# Patient Record
Sex: MALE | Race: WHITE | NOT HISPANIC OR LATINO | Employment: OTHER | URBAN - METROPOLITAN AREA
[De-identification: names, ages, dates, MRNs, and addresses within clinical notes are randomized per-mention and may not be internally consistent; named-entity substitution may affect disease eponyms.]

---

## 2020-03-10 ENCOUNTER — EVALUATION (OUTPATIENT)
Dept: PHYSICAL THERAPY | Facility: CLINIC | Age: 75
End: 2020-03-10
Payer: COMMERCIAL

## 2020-03-10 ENCOUNTER — TRANSCRIBE ORDERS (OUTPATIENT)
Dept: PHYSICAL THERAPY | Facility: CLINIC | Age: 75
End: 2020-03-10

## 2020-03-10 DIAGNOSIS — M25.561 CHRONIC PAIN OF RIGHT KNEE: Primary | ICD-10-CM

## 2020-03-10 DIAGNOSIS — G89.29 CHRONIC PAIN OF RIGHT KNEE: Primary | ICD-10-CM

## 2020-03-10 DIAGNOSIS — M25.561 RECURRENT PAIN OF RIGHT KNEE: Primary | ICD-10-CM

## 2020-03-10 PROCEDURE — 97110 THERAPEUTIC EXERCISES: CPT | Performed by: PHYSICAL THERAPIST

## 2020-03-10 PROCEDURE — 97162 PT EVAL MOD COMPLEX 30 MIN: CPT | Performed by: PHYSICAL THERAPIST

## 2020-03-10 PROCEDURE — 97530 THERAPEUTIC ACTIVITIES: CPT | Performed by: PHYSICAL THERAPIST

## 2020-03-10 NOTE — PROGRESS NOTES
PT Evaluation     Today's date: 3/10/2020  Patient name: Olena Chandler  :   MRN: [id number]  Referring provider: Eder St MD  Dx:   Encounter Diagnosis     ICD-10-CM    1  Recurrent pain of right knee M25 561                   Assessment  Assessment details: Olena Chandler is a 75 y o  male presenting to outpatient physical therapy at [place] [address] with complaints of R knee pain and presents for pre-op OP PT  He presents with decreased range of motion, decreased strength, limited flexibility, poor postural awareness, poor body mechanics, poor balance, decreased tolerance to activity and decreased functional mobility due to Recurrent pain of right knee  (primary encounter diagnosis)   Patient was educated thoroughly on expectations after surgery with pain and possible swelling, edema management, updated and reviewed HEP, stair mobility, and using AD  Reviewed information with wife  He would benefit from skilled PT services in order to address these deficits and reach maximum level of function   Thank you for the referral!  Impairments: abnormal gait, abnormal muscle firing, abnormal muscle tone, abnormal or restricted ROM, abnormal movement, activity intolerance, impaired balance, impaired physical strength, lacks appropriate home exercise program, pain with function, weight-bearing intolerance and poor body mechanics    Symptom irritability: moderateUnderstanding of Dx/Px/POC: good   Prognosis: good    Goals  STG/LTGs (in 4 weeks):  1  Pt will be independent with HEP  2  Pt will demonstrate independence with using AD  3  Pt will demonstrate safety with stair mobility and independence       Plan  Patient would benefit from: PT eval  Planned modality interventions: unattended electrical stimulation and TENS  Planned therapy interventions: strengthening, stretching, therapeutic activities, therapeutic exercise, home exercise program, patient education, joint mobilization, balance and manual therapy  Frequency: 2x week  Plan of Care beginning date: 3/10/2020  Plan of Care expiration date: 2020  Treatment plan discussed with: patient and family        Subjective Evaluation    History of Present Illness  Mechanism of injury: Pt is a 75year old male who presents with long history of right knee pain and is pre-op R TKA scheduled on 3/30/2020  He reports having an Xray that revealed R OA  Now referred to skilled OP PT     Quality of life: good    Pain  Current pain rating: 3  At best pain ratin  At worst pain ratin  Quality: discomfort and dull ache  Relieving factors: relaxation and rest  Aggravating factors: standing, walking and stair climbing  Progression: worsening    Social Support  Steps to enter house: yes (4 steps to enter without railing)  Stairs in house: yes (14 stairs with R sided rail)       Diagnostic Tests  X-ray: abnormal  Patient Goals  Patient goals for therapy: decreased pain, improved balance, increased motion, increased strength, independence with ADLs/IADLs, return to sport/leisure activities and return to work          Objective  Palpation: TTPR medial and lateral joint line, anterior tibialis    AROM: (measured in degrees); (*=pain)     (R)   (L)  Knee Flexion  110*   128  Knee Extension -10 (hypo)  0    MMT:     (R)   (L)  Static K' Ext  4-/5   4+/5  Static K' Flex  4-/5   4+/5  Dynamic K' Ext 3-/5   4+/5  Dynamic K' Flex 3-/5   4+/5    Balance: SLS: (R) unable (L) unable     Special tests: (-) Thessley's test, (+) Irma's test, (+) Valgus test      Precautions: standard    HEP: quad sets, heel slides    Specialty Daily Treatment Diary       Manual 3/10                                               Exercise Diary         Quad sets 5 sec x 10       Heel slides  5 sec x 10       Seated Hamstring stretch                Gait Training with AD        Stair mobility        Transfer training EDU/HEP 25 mins        Modalities

## 2020-03-10 NOTE — LETTER
March 10, 2020    Lorene Shah MD  [address] [place] [address],[place] [address]  [facility]    Patient: Mike Delgado   YOB: 1945   Date of Visit: 3/10/2020     Encounter Diagnosis     ICD-10-CM    1  Recurrent pain of right knee M25 561        Dear Dr Kain Jones: Thank you for your recent referral of Mike Delgado  Please review the attached evaluation summary from Daniel's recent visit  Please verify that you agree with the plan of care by signing the attached order  If you have any questions or concerns, please do not hesitate to call  I sincerely appreciate the opportunity to share in the care of one of your patients and hope to have another opportunity to work with you in the near future  Sincerely,    Connor Prabhakar, PT      Referring Provider:      I certify that I have read the below Plan of Care and certify the need for these services furnished under this plan of treatment while under my care  Lorene Shah MD  [address] [place] [address],[address]: [phone]          PT Evaluation     Today's date: 3/10/2020  Patient name: Mike Delgado  :   MRN: [id number]  Referring provider: Mason Westfall MD  Dx:   Encounter Diagnosis     ICD-10-CM    1  Recurrent pain of right knee M25 561                   Assessment  Assessment details: Mike Delgado is a 75 y o  male presenting to outpatient physical therapy at [place] [address] with complaints of R knee pain and presents for pre-op OP PT   He presents with decreased range of motion, decreased strength, limited flexibility, poor postural awareness, poor body mechanics, poor balance, decreased tolerance to activity and decreased functional mobility due to Recurrent pain of right knee  (primary encounter diagnosis)   Patient was educated thoroughly on expectations after surgery with pain and possible swelling, edema management, updated and reviewed HEP, stair mobility, and using AD  Reviewed information with wife  He would benefit from skilled PT services in order to address these deficits and reach maximum level of function   Thank you for the referral!  Impairments: abnormal gait, abnormal muscle firing, abnormal muscle tone, abnormal or restricted ROM, abnormal movement, activity intolerance, impaired balance, impaired physical strength, lacks appropriate home exercise program, pain with function, weight-bearing intolerance and poor body mechanics    Symptom irritability: moderateUnderstanding of Dx/Px/POC: good   Prognosis: good    Goals  STG/LTGs (in 4 weeks):  1  Pt will be independent with HEP  2  Pt will demonstrate independence with using AD  3  Pt will demonstrate safety with stair mobility and independence  Plan  Patient would benefit from: PT eval  Planned modality interventions: unattended electrical stimulation and TENS  Planned therapy interventions: strengthening, stretching, therapeutic activities, therapeutic exercise, home exercise program, patient education, joint mobilization, balance and manual therapy  Frequency: 2x week  Plan of Care beginning date: 3/10/2020  Plan of Care expiration date: 2020  Treatment plan discussed with: patient and family        Subjective Evaluation    History of Present Illness  Mechanism of injury: Pt is a 75year old male who presents with long history of right knee pain and is pre-op R TKA scheduled on 3/30/2020  He reports having an Xray that revealed R OA  Now referred to skilled OP PT     Quality of life: good    Pain  Current pain rating: 3  At best pain ratin  At worst pain ratin  Quality: discomfort and dull ache  Relieving factors: relaxation and rest  Aggravating factors: standing, walking and stair climbing  Progression: worsening    Social Support  Steps to enter house: yes (4 steps to enter without railing)  Stairs in house: yes (14 stairs with R sided rail)       Diagnostic Tests  X-ray: abnormal  Patient Goals  Patient goals for therapy: decreased pain, improved balance, increased motion, increased strength, independence with ADLs/IADLs, return to sport/leisure activities and return to work          Objective  Palpation: TTPR medial and lateral joint line, anterior tibialis    AROM: (measured in degrees); (*=pain)     (R)   (L)  Knee Flexion  110*   128  Knee Extension -10 (hypo)  0    MMT:     (R)   (L)  Static K' Ext  4-/5   4+/5  Static K' Flex  4-/5   4+/5  Dynamic K' Ext 3-/5   4+/5  Dynamic K' Flex 3-/5   4+/5    Balance: SLS: (R) unable (L) unable     Special tests: (-) Thessley's test, (+) Irma's test, (+) Valgus test      Precautions: standard    HEP: quad sets, heel slides    Specialty Daily Treatment Diary       Manual 3/10                                               Exercise Diary         Quad sets 5 sec x 10       Heel slides  5 sec x 10                                                                                                                                               EDU/HEP 25 mins        Modalities

## 2020-03-17 ENCOUNTER — OFFICE VISIT (OUTPATIENT)
Dept: PHYSICAL THERAPY | Facility: CLINIC | Age: 75
End: 2020-03-17
Payer: COMMERCIAL

## 2020-03-17 DIAGNOSIS — M25.561 RECURRENT PAIN OF RIGHT KNEE: Primary | ICD-10-CM

## 2020-03-17 PROCEDURE — 97530 THERAPEUTIC ACTIVITIES: CPT | Performed by: PHYSICAL THERAPIST

## 2020-03-17 PROCEDURE — 97110 THERAPEUTIC EXERCISES: CPT

## 2020-03-17 NOTE — PROGRESS NOTES
Daily Note     Today's date: 3/17/2020  Patient name: Stanton Ballard  :   MRN: [id number]  Referring provider: Iesha Bradford MD  Dx:   Encounter Diagnosis     ICD-10-CM    1  Recurrent pain of right knee M25 561                   Subjective: Patient states he is doing well today  Objective: See treatment diary below      Assessment: Tolerated treatment well  Initiated POC with gait training, transfer training and stair mobility instructed by Luz Maria Curry DPT  Introduced TE to  [facility] this visit, required occasional VCs for correct form and technique  Provided patient with HEP  Patient would benefit from continued PT      Plan: Continue per plan of care          HEP: quad sets, heel slides    Specialty Daily Treatment Diary       Manual 3/10 3/17                                              Exercise Diary         Quad sets 5 sec x 10       Heel slides  5 sec x 10       Seated Hamstring stretch  20 sec x 3              Gait Training with AD  SF      Stair mobility  SF      Transfer training   SF      Gastroc stretch  20"x3      3 way H'   10x ea direction      SLR (flex, abd)  10x ea direction      SAQ/LAQ  10x ea                                                                      EDU/HEP 25 mins        Modalities

## 2020-06-01 ENCOUNTER — APPOINTMENT (OUTPATIENT)
Dept: PHYSICAL THERAPY | Facility: CLINIC | Age: 75
End: 2020-06-01
Payer: COMMERCIAL

## 2020-06-03 ENCOUNTER — APPOINTMENT (OUTPATIENT)
Dept: PHYSICAL THERAPY | Facility: CLINIC | Age: 75
End: 2020-06-03
Payer: COMMERCIAL

## 2020-06-03 ENCOUNTER — EVALUATION (OUTPATIENT)
Dept: PHYSICAL THERAPY | Facility: CLINIC | Age: 75
End: 2020-06-03
Payer: COMMERCIAL

## 2020-06-03 DIAGNOSIS — R26.2 DIFFICULTY WALKING: ICD-10-CM

## 2020-06-03 DIAGNOSIS — M25.561 RECURRENT PAIN OF RIGHT KNEE: Primary | ICD-10-CM

## 2020-06-03 DIAGNOSIS — Z96.651 STATUS POST TOTAL RIGHT KNEE REPLACEMENT: ICD-10-CM

## 2020-06-03 DIAGNOSIS — M62.81 GENERALIZED MUSCLE WEAKNESS: ICD-10-CM

## 2020-06-03 PROCEDURE — 97530 THERAPEUTIC ACTIVITIES: CPT | Performed by: PHYSICAL THERAPIST

## 2020-06-03 PROCEDURE — 97164 PT RE-EVAL EST PLAN CARE: CPT | Performed by: PHYSICAL THERAPIST

## 2020-06-03 PROCEDURE — 97110 THERAPEUTIC EXERCISES: CPT | Performed by: PHYSICAL THERAPIST

## 2020-06-04 ENCOUNTER — TRANSCRIBE ORDERS (OUTPATIENT)
Dept: PHYSICAL THERAPY | Facility: CLINIC | Age: 75
End: 2020-06-04

## 2020-06-04 DIAGNOSIS — M25.561 RECURRENT PAIN OF RIGHT KNEE: Primary | ICD-10-CM

## 2020-06-05 ENCOUNTER — APPOINTMENT (OUTPATIENT)
Dept: PHYSICAL THERAPY | Facility: CLINIC | Age: 75
End: 2020-06-05
Payer: COMMERCIAL

## 2020-06-05 ENCOUNTER — OFFICE VISIT (OUTPATIENT)
Dept: PHYSICAL THERAPY | Facility: CLINIC | Age: 75
End: 2020-06-05
Payer: COMMERCIAL

## 2020-06-05 DIAGNOSIS — Z96.651 STATUS POST TOTAL RIGHT KNEE REPLACEMENT: ICD-10-CM

## 2020-06-05 DIAGNOSIS — M25.561 RECURRENT PAIN OF RIGHT KNEE: Primary | ICD-10-CM

## 2020-06-05 DIAGNOSIS — R26.2 DIFFICULTY WALKING: ICD-10-CM

## 2020-06-05 DIAGNOSIS — M62.81 GENERALIZED MUSCLE WEAKNESS: ICD-10-CM

## 2020-06-05 PROCEDURE — 97110 THERAPEUTIC EXERCISES: CPT | Performed by: PHYSICAL THERAPIST

## 2020-06-05 PROCEDURE — 97140 MANUAL THERAPY 1/> REGIONS: CPT | Performed by: PHYSICAL THERAPIST

## 2020-06-08 ENCOUNTER — OFFICE VISIT (OUTPATIENT)
Dept: PHYSICAL THERAPY | Facility: CLINIC | Age: 75
End: 2020-06-08
Payer: COMMERCIAL

## 2020-06-08 ENCOUNTER — APPOINTMENT (OUTPATIENT)
Dept: PHYSICAL THERAPY | Facility: CLINIC | Age: 75
End: 2020-06-08
Payer: COMMERCIAL

## 2020-06-08 DIAGNOSIS — M25.561 RECURRENT PAIN OF RIGHT KNEE: Primary | ICD-10-CM

## 2020-06-08 DIAGNOSIS — M62.81 GENERALIZED MUSCLE WEAKNESS: ICD-10-CM

## 2020-06-08 DIAGNOSIS — R26.2 DIFFICULTY WALKING: ICD-10-CM

## 2020-06-08 DIAGNOSIS — Z96.651 STATUS POST TOTAL RIGHT KNEE REPLACEMENT: ICD-10-CM

## 2020-06-08 PROCEDURE — 97112 NEUROMUSCULAR REEDUCATION: CPT | Performed by: PHYSICAL THERAPIST

## 2020-06-08 PROCEDURE — 97110 THERAPEUTIC EXERCISES: CPT | Performed by: PHYSICAL THERAPIST

## 2020-06-08 PROCEDURE — 97140 MANUAL THERAPY 1/> REGIONS: CPT | Performed by: PHYSICAL THERAPIST

## 2020-06-09 ENCOUNTER — APPOINTMENT (OUTPATIENT)
Dept: PHYSICAL THERAPY | Facility: CLINIC | Age: 75
End: 2020-06-09
Payer: COMMERCIAL

## 2020-06-10 ENCOUNTER — APPOINTMENT (OUTPATIENT)
Dept: PHYSICAL THERAPY | Facility: CLINIC | Age: 75
End: 2020-06-10
Payer: COMMERCIAL

## 2020-06-11 ENCOUNTER — APPOINTMENT (OUTPATIENT)
Dept: PHYSICAL THERAPY | Facility: CLINIC | Age: 75
End: 2020-06-11
Payer: COMMERCIAL

## 2020-06-12 ENCOUNTER — APPOINTMENT (OUTPATIENT)
Dept: PHYSICAL THERAPY | Facility: CLINIC | Age: 75
End: 2020-06-12
Payer: COMMERCIAL

## 2020-06-15 ENCOUNTER — OFFICE VISIT (OUTPATIENT)
Dept: PHYSICAL THERAPY | Facility: CLINIC | Age: 75
End: 2020-06-15
Payer: COMMERCIAL

## 2020-06-15 DIAGNOSIS — R26.2 DIFFICULTY WALKING: ICD-10-CM

## 2020-06-15 DIAGNOSIS — Z96.651 STATUS POST TOTAL RIGHT KNEE REPLACEMENT: ICD-10-CM

## 2020-06-15 DIAGNOSIS — M25.561 RECURRENT PAIN OF RIGHT KNEE: Primary | ICD-10-CM

## 2020-06-15 DIAGNOSIS — M62.81 GENERALIZED MUSCLE WEAKNESS: ICD-10-CM

## 2020-06-15 PROCEDURE — 97112 NEUROMUSCULAR REEDUCATION: CPT

## 2020-06-15 PROCEDURE — 97110 THERAPEUTIC EXERCISES: CPT

## 2020-06-15 PROCEDURE — 97140 MANUAL THERAPY 1/> REGIONS: CPT

## 2020-06-17 ENCOUNTER — OFFICE VISIT (OUTPATIENT)
Dept: PHYSICAL THERAPY | Facility: CLINIC | Age: 75
End: 2020-06-17
Payer: COMMERCIAL

## 2020-06-17 DIAGNOSIS — M25.561 RECURRENT PAIN OF RIGHT KNEE: Primary | ICD-10-CM

## 2020-06-17 DIAGNOSIS — Z96.651 STATUS POST TOTAL RIGHT KNEE REPLACEMENT: ICD-10-CM

## 2020-06-17 DIAGNOSIS — M62.81 GENERALIZED MUSCLE WEAKNESS: ICD-10-CM

## 2020-06-17 DIAGNOSIS — R26.2 DIFFICULTY WALKING: ICD-10-CM

## 2020-06-17 PROCEDURE — 97530 THERAPEUTIC ACTIVITIES: CPT | Performed by: PHYSICAL THERAPIST

## 2020-06-17 PROCEDURE — 97110 THERAPEUTIC EXERCISES: CPT | Performed by: PHYSICAL THERAPIST

## 2020-06-17 PROCEDURE — 97140 MANUAL THERAPY 1/> REGIONS: CPT | Performed by: PHYSICAL THERAPIST

## 2020-06-17 PROCEDURE — 97112 NEUROMUSCULAR REEDUCATION: CPT | Performed by: PHYSICAL THERAPIST

## 2020-06-19 ENCOUNTER — OFFICE VISIT (OUTPATIENT)
Dept: PHYSICAL THERAPY | Facility: CLINIC | Age: 75
End: 2020-06-19
Payer: COMMERCIAL

## 2020-06-19 DIAGNOSIS — M62.81 GENERALIZED MUSCLE WEAKNESS: ICD-10-CM

## 2020-06-19 DIAGNOSIS — M25.561 RECURRENT PAIN OF RIGHT KNEE: Primary | ICD-10-CM

## 2020-06-19 DIAGNOSIS — Z96.651 STATUS POST TOTAL RIGHT KNEE REPLACEMENT: ICD-10-CM

## 2020-06-19 DIAGNOSIS — R26.2 DIFFICULTY WALKING: ICD-10-CM

## 2020-06-19 PROCEDURE — 97110 THERAPEUTIC EXERCISES: CPT

## 2020-06-19 PROCEDURE — 97140 MANUAL THERAPY 1/> REGIONS: CPT

## 2020-06-19 PROCEDURE — 97112 NEUROMUSCULAR REEDUCATION: CPT

## 2020-06-22 ENCOUNTER — OFFICE VISIT (OUTPATIENT)
Dept: PHYSICAL THERAPY | Facility: CLINIC | Age: 75
End: 2020-06-22
Payer: COMMERCIAL

## 2020-06-22 DIAGNOSIS — M25.561 RECURRENT PAIN OF RIGHT KNEE: Primary | ICD-10-CM

## 2020-06-22 DIAGNOSIS — Z96.651 STATUS POST TOTAL RIGHT KNEE REPLACEMENT: ICD-10-CM

## 2020-06-22 DIAGNOSIS — R26.2 DIFFICULTY WALKING: ICD-10-CM

## 2020-06-22 DIAGNOSIS — M62.81 GENERALIZED MUSCLE WEAKNESS: ICD-10-CM

## 2020-06-22 PROCEDURE — 97110 THERAPEUTIC EXERCISES: CPT

## 2020-06-22 PROCEDURE — 97112 NEUROMUSCULAR REEDUCATION: CPT

## 2020-06-22 PROCEDURE — 97140 MANUAL THERAPY 1/> REGIONS: CPT

## 2020-06-24 ENCOUNTER — OFFICE VISIT (OUTPATIENT)
Dept: PHYSICAL THERAPY | Facility: CLINIC | Age: 75
End: 2020-06-24
Payer: COMMERCIAL

## 2020-06-24 DIAGNOSIS — Z96.651 STATUS POST TOTAL RIGHT KNEE REPLACEMENT: ICD-10-CM

## 2020-06-24 DIAGNOSIS — M25.561 RECURRENT PAIN OF RIGHT KNEE: Primary | ICD-10-CM

## 2020-06-24 DIAGNOSIS — R26.2 DIFFICULTY WALKING: ICD-10-CM

## 2020-06-24 DIAGNOSIS — M62.81 GENERALIZED MUSCLE WEAKNESS: ICD-10-CM

## 2020-06-24 PROCEDURE — 97110 THERAPEUTIC EXERCISES: CPT

## 2020-06-24 PROCEDURE — 97112 NEUROMUSCULAR REEDUCATION: CPT

## 2020-06-24 PROCEDURE — 97140 MANUAL THERAPY 1/> REGIONS: CPT

## 2020-06-26 ENCOUNTER — OFFICE VISIT (OUTPATIENT)
Dept: PHYSICAL THERAPY | Facility: CLINIC | Age: 75
End: 2020-06-26
Payer: COMMERCIAL

## 2020-06-26 DIAGNOSIS — M62.81 GENERALIZED MUSCLE WEAKNESS: ICD-10-CM

## 2020-06-26 DIAGNOSIS — M25.561 RECURRENT PAIN OF RIGHT KNEE: Primary | ICD-10-CM

## 2020-06-26 DIAGNOSIS — R26.2 DIFFICULTY WALKING: ICD-10-CM

## 2020-06-26 DIAGNOSIS — Z96.651 STATUS POST TOTAL RIGHT KNEE REPLACEMENT: ICD-10-CM

## 2020-06-26 PROCEDURE — 97112 NEUROMUSCULAR REEDUCATION: CPT

## 2020-06-26 PROCEDURE — 97110 THERAPEUTIC EXERCISES: CPT

## 2020-06-26 PROCEDURE — 97140 MANUAL THERAPY 1/> REGIONS: CPT

## 2020-06-29 ENCOUNTER — OFFICE VISIT (OUTPATIENT)
Dept: PHYSICAL THERAPY | Facility: CLINIC | Age: 75
End: 2020-06-29
Payer: COMMERCIAL

## 2020-06-29 DIAGNOSIS — M25.561 RECURRENT PAIN OF RIGHT KNEE: Primary | ICD-10-CM

## 2020-06-29 DIAGNOSIS — Z96.651 STATUS POST TOTAL RIGHT KNEE REPLACEMENT: ICD-10-CM

## 2020-06-29 DIAGNOSIS — R26.2 DIFFICULTY WALKING: ICD-10-CM

## 2020-06-29 DIAGNOSIS — M62.81 GENERALIZED MUSCLE WEAKNESS: ICD-10-CM

## 2020-06-29 PROCEDURE — 97140 MANUAL THERAPY 1/> REGIONS: CPT

## 2020-06-29 PROCEDURE — 97110 THERAPEUTIC EXERCISES: CPT

## 2020-06-29 PROCEDURE — 97112 NEUROMUSCULAR REEDUCATION: CPT

## 2020-07-01 ENCOUNTER — OFFICE VISIT (OUTPATIENT)
Dept: PHYSICAL THERAPY | Facility: CLINIC | Age: 75
End: 2020-07-01
Payer: COMMERCIAL

## 2020-07-01 DIAGNOSIS — M62.81 GENERALIZED MUSCLE WEAKNESS: ICD-10-CM

## 2020-07-01 DIAGNOSIS — R26.2 DIFFICULTY WALKING: ICD-10-CM

## 2020-07-01 DIAGNOSIS — Z96.651 STATUS POST TOTAL RIGHT KNEE REPLACEMENT: ICD-10-CM

## 2020-07-01 DIAGNOSIS — M25.561 RECURRENT PAIN OF RIGHT KNEE: Primary | ICD-10-CM

## 2020-07-01 PROCEDURE — 97110 THERAPEUTIC EXERCISES: CPT

## 2020-07-01 PROCEDURE — 97112 NEUROMUSCULAR REEDUCATION: CPT

## 2020-07-01 PROCEDURE — 97140 MANUAL THERAPY 1/> REGIONS: CPT

## 2020-07-01 NOTE — PROGRESS NOTES
Daily Note     Today's date: 2020  Patient name: Faustino Delgado  : [birth year]  MRN: [id number]  Referring provider: Gavino Powers MD  Dx:   Encounter Diagnosis     ICD-10-CM    1  Recurrent pain of right knee M25 561    2  Difficulty walking R26 2    3  Generalized muscle weakness M62 81    4  Status post total right knee replacement [id number] [id number]                   Subjective: Patient states he continues to feel soreness when walking around for long periods of time  Objective: See treatment diary below      Assessment: Tolerated treatment well  Continued to progress patient through exercises this visit  Introduced theraband hip kicks, sidestepping, and prone quad stretch  Patient had difficulty getting onto his stomach and needed some assistance for prone quad stretch  AROM measured at 3 degrees into extension and 100 degrees into flexion  Patient demonstrated fatigue post treatment, exhibited good technique with therapeutic exercises and would benefit from continued PT      Plan: Continue per plan of care  HEP: quad sets, heel slides    Specialty Daily Treatment Diary       Manual    R pat mobs; R TF jt AP glides RA mobs RA mobs AFB pat prom RA pat mob RA pat PROM   R knee Flex/Ext stretch RA (supine knee ext and sitting knee flexion) RA (supine knee ext and sitting knee flexion) RA (supine knee ext and knee flexion) RA supine knee ext sitting knee flexion RA supine knee extension  Sitting kne flexion   R HS, baljit, calf, piriformis stretch        IASTM;  Tiger tail                Exercise Diary         RB (toggle -> full) 5' toggle 5' toggle 5' toggle 5' toggle 5' toggle   Quad sets c MH c bolster -> NMES c QS 5 sec x 20 towel roll underneath 10 sec x 15 c towel roll underneath ankle w/o NMES or heat 10 sec x 15 c towel roll underneath ankle w/o NMES or heat 5 sec x 20 towel roll underneath 5 sec x 20 towel roll underneath   SLR (flex, ER c NMES) 0# 3x10 flex only 0# 2x10 (flex only w/o NMES) 0# 2x10 (flex only w/o NMES) 0# 2x10 flex only 0# 3x10 flex only   SAQ/LAQ c towel roll (NMES) 0# 30x (SAQ and LAQ) 0# 25x (SAQ and LAQ w/o NMES)  0# 25x (SAQ and LAQ w/o NMES)  0# 25x (SAQ and LAQ w/o NMES)  0# 30x (SAQ and LAQ)   Supine Heel Slides c SOS 3" x20 3" x 15 3" x 15 3" x 20 3"x20   Prone quad stretch 20"x3        Prone H' Ext        TB Bridges/ Clamshells                Seated HS stretch        Seated Knee Flex stretch c AAROM from LLE  At edge of table: 5 sec x 20 AAROM At edge of table: 5 sec x 20 AAROM At edge of table: 5 sec x 20 AAROM At edge of table: 5 sec x 20 AAROM           St HR/TR 30 HR/TR 25 HR 25 HR 25 HR 25 HR/TR   Marching; H' ABD; Ext; HS Curls 30 marching at bar 25 (marching at bar) NV 25 marching at bar 30 marching at bar   Gastroc stretch on slant board Only toes on slant board 20 sec x 3 Only toes on slant board 20 sec x 3 NV Only toes on slant board 20 sec x 3 Only toes on slant board 20 sec x 3   Squats at bar with chair 3x10 3x10 NV 3x10  3x10   Step Ups; Lat Step ups;  Step Downs 4") 3x10 (step up bar on left hand and R hand on RW focus on march) (4") 2x10 (step up bar on left hand and R hand on RW focus on march) (4") 2x10 (step up bar on left hand and R hand on RW focus on march) (4") 2x10 (step up bar on left hand and R hand on RW focus on march) (4") 3x10 (step up bar on left hand and R hand on RW focus on march)   SLS 15"x3 on floor    15"x3 on floor           TB -> Mima TKE        TB 3 way H'  OTB 10x ea direction       Lat Band Walks OTB 3 laps       Leg Press        Gait Training  50 ft x 4 using RW focusing on marching for knee flexion 50 ft x 4 using RW focusing on marching for knee flexion 50 ft x 4 using RW focusing on marching for knee flexion 50 ft x 4 using RW focusing on marching for knee flexion 50 ft x 4 using RW focusing on marching for knee flexion   EDU/HEP        Modalities        MH        CP/Game Ready 10 min game ready post   elevation 10 min game read post c elevation 10 min game read post c elevation 10 min game read post elevation 10 min game ready post   elevation

## 2020-07-02 ENCOUNTER — OFFICE VISIT (OUTPATIENT)
Dept: PHYSICAL THERAPY | Facility: CLINIC | Age: 75
End: 2020-07-02
Payer: COMMERCIAL

## 2020-07-02 DIAGNOSIS — M62.81 GENERALIZED MUSCLE WEAKNESS: ICD-10-CM

## 2020-07-02 DIAGNOSIS — M25.561 RECURRENT PAIN OF RIGHT KNEE: Primary | ICD-10-CM

## 2020-07-02 DIAGNOSIS — Z96.651 STATUS POST TOTAL RIGHT KNEE REPLACEMENT: ICD-10-CM

## 2020-07-02 DIAGNOSIS — R26.2 DIFFICULTY WALKING: ICD-10-CM

## 2020-07-02 PROCEDURE — 97140 MANUAL THERAPY 1/> REGIONS: CPT

## 2020-07-02 PROCEDURE — 97112 NEUROMUSCULAR REEDUCATION: CPT

## 2020-07-02 PROCEDURE — 97110 THERAPEUTIC EXERCISES: CPT

## 2020-07-02 NOTE — PROGRESS NOTES
Daily Note     Today's date: 2020  Patient name: Alexandro Phalen  :   MRN: [id number]  Referring provider: Saira Britt MD  Dx:   Encounter Diagnosis     ICD-10-CM    1  Recurrent pain of right knee M25 561    2  Difficulty walking R26 2    3  Generalized muscle weakness M62 81    4  Status post total right knee replacement [id number]                   Subjective: Patient denies soreness from progressions made last visit  Objective: See treatment diary below      Assessment: Tolerated treatment well  Continued to progress patient through exercises this visit  Introduced lateral step ups, step downs, and clamshells with good tolerance  Attempted ambulation with SPC and demonstrated good balance  He did need cuing for correct placement of SPC due to ambulating on wrong side  Patient continues to make great progressions toward goals  Patient demonstrated fatigue post treatment, exhibited good technique with therapeutic exercises and would benefit from continued PT      Plan: Continue per plan of care  HEP: quad sets, heel slides    Specialty Daily Treatment Diary       Manual    R pat mobs; R TF jt AP glides RA mobs RA mobs AFB pat prom RA pat mob RA pat PROM   R knee Flex/Ext stretch RA (supine knee ext and sitting knee flexion) RA (supine knee ext and sitting knee flexion) RA (supine knee ext and knee flexion) RA supine knee ext sitting knee flexion RA supine knee extension  Sitting kne flexion   R HS, baljit, calf, piriformis stretch        IASTM;  Tiger tail                Exercise Diary         RB (toggle -> full) 5' toggle 5' toggle 5' toggle 5' toggle 5' toggle   Quad sets c MH c bolster -> NMES c QS 5 sec x 20 towel roll underneath 5 sec x 20 towel roll underneath 10 sec x 15 c towel roll underneath ankle w/o NMES or heat 5 sec x 20 towel roll underneath 5 sec x 20 towel roll underneath   SLR (flex, ER c NMES) 0# 3x10 flex only 1# 3x10 flex only 0# 2x10 (flex only w/o NMES) 0# 2x10 flex only 0# 3x10 flex only   SAQ/LAQ c towel roll (NMES) 0# 30x (SAQ and LAQ) 1# 30 0# 25x (SAQ and LAQ w/o NMES)  0# 25x (SAQ and LAQ w/o NMES)  0# 30x (SAQ and LAQ)   Supine Heel Slides c SOS 3" x20 3" x 20 3" x 15 3" x 20 3"x20   Prone quad stretch 20"x3  NV      Prone H' Ext        TB Bridges/ Clamshells  OTB clams hooklying 20x              Seated HS stretch        Seated Knee Flex stretch c AAROM from LLE  At edge of table: 5 sec x 20 AAROM At edge of table: 5 sec x 20 AAROM At edge of table: 5 sec x 20 AAROM At edge of table: 5 sec x 20 AAROM           St HR/TR 30 HR/TR 30 HR/TR 25 HR 25 HR 25 HR/TR   Marching; H' ABD; Ext; HS Curls 30 marching at bar 30 marching at bar NV 25 marching at bar 30 marching at bar   Gastroc stretch on slant board Only toes on slant board 20 sec x 3 Only toes on slant board 20 sec x 3 NV Only toes on slant board 20 sec x 3 Only toes on slant board 20 sec x 3   Squats at bar with chair 3x10 3x10 NV 3x10  3x10   Step downs   4" 2x10 left hand on walker R hand on bar      Lateral step ups   4" step 2x10      Step Ups;  4") 3x10 (step up bar on left hand and R hand on RW focus on march) 4") 3x10 (step up bar on left hand and R hand on RW focus on march) (4") 2x10 (step up bar on left hand and R hand on RW focus on march) (4") 2x10 (step up bar on left hand and R hand on RW focus on march) (4") 3x10 (step up bar on left hand and R hand on RW focus on march)   SLS 15"x3 on floor 15" x 3 on floor   15"x3 on floor           TB -> Mima TKE        TB 3 way H'  OTB 10x ea direction OTB 10x ea direction      Lat Band Walks OTB 3 laps OTB 3 laps      Leg Press        Gait Training  50 ft x 4 using RW focusing on marching for knee flexion 50ft x 4 using SPC on marching for knee flexion 50 ft x 4 using RW focusing on marching for knee flexion 50 ft x 4 using RW focusing on marching for knee flexion 50 ft x 4 using RW focusing on marching for knee flexion   EDU/HEP Modalities        MH        CP/Game Ready 10 min game ready post   elevation  10 min game read post c elevation 10 min game read post elevation 10 min game ready post   elevation

## 2020-07-06 ENCOUNTER — OFFICE VISIT (OUTPATIENT)
Dept: PHYSICAL THERAPY | Facility: CLINIC | Age: 75
End: 2020-07-06
Payer: COMMERCIAL

## 2020-07-06 DIAGNOSIS — M62.81 GENERALIZED MUSCLE WEAKNESS: ICD-10-CM

## 2020-07-06 DIAGNOSIS — R26.2 DIFFICULTY WALKING: ICD-10-CM

## 2020-07-06 DIAGNOSIS — Z96.651 STATUS POST TOTAL RIGHT KNEE REPLACEMENT: ICD-10-CM

## 2020-07-06 DIAGNOSIS — M25.561 RECURRENT PAIN OF RIGHT KNEE: Primary | ICD-10-CM

## 2020-07-06 PROCEDURE — 97110 THERAPEUTIC EXERCISES: CPT

## 2020-07-06 PROCEDURE — 97140 MANUAL THERAPY 1/> REGIONS: CPT

## 2020-07-06 PROCEDURE — 97112 NEUROMUSCULAR REEDUCATION: CPT

## 2020-07-06 NOTE — PROGRESS NOTES
Daily Note     Today's date: 2020  Patient name: Faustino Delgado  :   MRN: [id number]  Referring provider: Gavino Powers MD  Dx:   Encounter Diagnosis     ICD-10-CM    1  Recurrent pain of right knee M25 561    2  Difficulty walking R26 2    3  Generalized muscle weakness M62 81    4  Status post total right knee replacement [id number] [id number]                   Subjective: Patient states he is doing well today  He continues to feel tenderness into knee  Objective: See treatment diary below      Assessment: Tolerated treatment well  Patient arrived to clinic ambulating with cane on R side, encouraged patient to utilize on L but he says he prefers the R  Continued to progress patient through exercises with good tolerance  He is demonstrating improvements into ROM and increase in strength  Patient demonstrated fatigue post treatment, exhibited good technique with therapeutic exercises and would benefit from continued PT      Plan: Continue per plan of care  HEP: quad sets, heel slides    Specialty Daily Treatment Diary       Manual    R pat mobs; R TF jt AP glides RA mobs RA mobs  RA pat mob RA pat PROM   R knee Flex/Ext stretch RA (supine knee ext and sitting knee flexion) RA (supine knee ext and sitting knee flexion)  RA supine knee ext sitting knee flexion RA supine knee extension  Sitting kne flexion   R HS, baljit, calf, piriformis stretch        IASTM;  Tiger tail                Exercise Diary         RB (toggle -> full) 5' toggle 5' toggle 5' toggle 5' toggle 5' toggle   Quad sets c MH c bolster -> NMES c QS 5 sec x 20 towel roll underneath 5 sec x 20 towel roll underneath 5 sec x 20 towel roll underneath 5 sec x 20 towel roll underneath 5 sec x 20 towel roll underneath   SLR (flex, ER c NMES) 0# 3x10 flex only 1# 3x10 flex only 1 5# 2x10 flex and ER 0# 2x10 flex only 0# 3x10 flex only   SAQ/LAQ c towel roll (NMES) 0# 30x (SAQ and LAQ) 1# 30 1 5# 30x  0# 25x (SAQ and LAQ w/o NMES)  0# 30x (SAQ and LAQ)   Supine Heel Slides c SOS 3" x20 3" x 20 3" x 20  3" x 20 3"x20   Prone quad stretch 20"x3  NV      Prone H' Ext        TB Bridges/ Clamshells  OTB clams hooklying 20x GTB clams hooklying 20x             Seated HS stretch        Seated Knee Flex stretch c AAROM from LLE  At edge of table: 5 sec x 20 AAROM At edge of table: 5 sec x 20 AAROM At edge of table: 5 sec x 20 AAROM At edge of table: 5 sec x 20 AAROM           St HR/TR 30 HR/TR 30 HR/TR 30 HR/TR 25 HR 25 HR/TR   Marching; H' ABD; Ext; HS Curls 30 marching at bar 30 marching at bar 30 marching at bar 1 5#  25 marching at bar 30 marching at bar   Gastroc stretch on slant board Only toes on slant board 20 sec x 3 Only toes on slant board 20 sec x 3 NV Only toes on slant board 20 sec x 3 Only toes on slant board 20 sec x 3   Squats at bar with chair 3x10 3x10 3x10 3x10  3x10   Step downs   4" 2x10 left hand on walker R hand on bar 4" 2x10      Lateral step ups   4" step 2x10 6" step 2x10      Step Ups;  4") 3x10 (step up bar on left hand and R hand on RW focus on march) 4") 3x10 (step up bar on left hand and R hand on RW focus on march) 6" step 2x10  (4") 2x10 (step up bar on left hand and R hand on RW focus on march) (4") 3x10 (step up bar on left hand and R hand on RW focus on march)   SLS 15"x3 on floor 15" x 3 on floor 15"x 3 on floor  15"x3 on floor           TB -> Mima TKE        TB 3 way H'  OTB 10x ea direction OTB 10x ea direction GTB 2x10 ea direction     Lat Band Walks OTB 3 laps OTB 3 laps GTB 3 laps     Leg Press        Gait Training  50 ft x 4 using RW focusing on marching for knee flexion 50ft x 4 using SPC on marching for knee flexion np  50 ft x 4 using RW focusing on marching for knee flexion 50 ft x 4 using RW focusing on marching for knee flexion   EDU/HEP        Modalities        MH        CP/Game Ready 10 min game ready post   elevation   10 min game read post elevation 10 min game ready post   elevation

## 2020-07-08 ENCOUNTER — OFFICE VISIT (OUTPATIENT)
Dept: PHYSICAL THERAPY | Facility: CLINIC | Age: 75
End: 2020-07-08
Payer: COMMERCIAL

## 2020-07-08 DIAGNOSIS — Z96.651 STATUS POST TOTAL RIGHT KNEE REPLACEMENT: ICD-10-CM

## 2020-07-08 DIAGNOSIS — M62.81 GENERALIZED MUSCLE WEAKNESS: ICD-10-CM

## 2020-07-08 DIAGNOSIS — R26.2 DIFFICULTY WALKING: ICD-10-CM

## 2020-07-08 DIAGNOSIS — M25.561 RECURRENT PAIN OF RIGHT KNEE: Primary | ICD-10-CM

## 2020-07-08 PROCEDURE — 97140 MANUAL THERAPY 1/> REGIONS: CPT

## 2020-07-08 PROCEDURE — 97110 THERAPEUTIC EXERCISES: CPT

## 2020-07-08 PROCEDURE — 97112 NEUROMUSCULAR REEDUCATION: CPT

## 2020-07-08 NOTE — PROGRESS NOTES
Daily Note     Today's date: 2020  Patient name: You Magallon  :   MRN: [id number]  Referring provider: Anthony Ramos MD  Dx:   Encounter Diagnosis     ICD-10-CM    1  Recurrent pain of right knee M25 561    2  Difficulty walking R26 2    3  Generalized muscle weakness M62 81    4  Status post total right knee replacement Z96 651                   Subjective: Patient states he is doing well today  Objective: See treatment diary below      Assessment: Tolerated treatment well  Initiated POC on RB, patient was able to complete full revolution on bike today without difficulty  Continued to progress patient through reps this visit  Patient demonstrated improvements into ROM with flexion today  He still presents with mild swelling therefore concluded with Game Ready  Patient demonstrated fatigue post treatment, exhibited good technique with therapeutic exercises and would benefit from continued PT      Plan: Continue per plan of care  HEP: quad sets, heel slides    Specialty Daily Treatment Diary       Manual    R pat mobs; R TF jt AP glides RA mobs RA mobs RA RA pat mob RA pat PROM   R knee Flex/Ext stretch RA (supine knee ext and sitting knee flexion) RA (supine knee ext and sitting knee flexion) RA RA supine knee ext sitting knee flexion RA supine knee extension  Sitting kne flexion   R HS, baljit, calf, piriformis stretch        IASTM;  Tiger tail                Exercise Diary         RB (toggle -> full) 5' toggle 5' toggle 5' toggle 5' full ROM 5' toggle   Quad sets c MH c bolster -> NMES c QS 5 sec x 20 towel roll underneath 5 sec x 20 towel roll underneath 5 sec x 20 towel roll underneath 5 sec x 20 towel roll  5 sec x 20 towel roll underneath   SLR (flex, ER c NMES) 0# 3x10 flex only 1# 3x10 flex only 1 5# 2x10 flex and ER 1 5# 3x10 flex and ER 0# 3x10 flex only   SAQ/LAQ c towel roll (NMES) 0# 30x (SAQ and LAQ) 1# 30 1 5# 30x  1 5# 30x 0# 30x (SAQ and LAQ) Supine Heel Slides c SOS 3" x20 3" x 20 3" x 20  3" x20 3"x20   Prone quad stretch 20"x3  NV      Prone H' Ext        TB Bridges/ Clamshells  OTB clams hooklying 20x GTB clams hooklying 20x GTB clam hooklying 20x            Seated HS stretch        Seated Knee Flex stretch c AAROM from LLE  At edge of table: 5 sec x 20 AAROM At edge of table: 5 sec x 20 AAROM  At edge of table: 5 sec x 20 AAROM           St HR/TR 30 HR/TR 30 HR/TR 30 HR/TR 30 HR/TR 25 HR/TR   Marching; H' ABD; Ext; HS Curls 30 marching at bar 30 marching at bar 30 marching at bar 1 5#  30 marching at bar 1 5# 30 marching at bar   Gastroc stretch on slant board Only toes on slant board 20 sec x 3 Only toes on slant board 20 sec x 3 NV  Only toes on slant board 20 sec x 3   Squats at bar with chair 3x10 3x10 3x10 3x10 3x10   Step downs   4" 2x10 left hand on walker R hand on bar 4" 2x10  4" 2x    Lateral step ups   4" step 2x10 6" step 2x10  6" step 3x10    Step Ups;  4") 3x10 (step up bar on left hand and R hand on RW focus on march) 4") 3x10 (step up bar on left hand and R hand on RW focus on march) 6" step 2x10  6" step 3x10 (4") 3x10 (step up bar on left hand and R hand on RW focus on march)   SLS 15"x3 on floor 15" x 3 on floor 15"x 3 on floor NV  15"x3 on floor           TB -> [place] TKE        TB 3 way H'  OTB 10x ea direction OTB 10x ea direction GTB 2x10 ea direction GTB 2x10 ea Direction    Lat Band Walks OTB 3 laps OTB 3 laps GTB 3 laps GTB 3 laps    Leg Press        Gait Training  50 ft x 4 using RW focusing on marching for knee flexion 50ft x 4 using SPC on marching for knee flexion np  DC 50 ft x 4 using RW focusing on marching for knee flexion   EDU/HEP        Modalities        MH        CP/Game Ready 10 min game ready post   elevation   10 min game read post elevation 10 min game ready post   elevation

## 2020-07-10 ENCOUNTER — OFFICE VISIT (OUTPATIENT)
Dept: PHYSICAL THERAPY | Facility: CLINIC | Age: 75
End: 2020-07-10
Payer: COMMERCIAL

## 2020-07-10 DIAGNOSIS — M62.81 GENERALIZED MUSCLE WEAKNESS: ICD-10-CM

## 2020-07-10 DIAGNOSIS — Z96.651 STATUS POST TOTAL RIGHT KNEE REPLACEMENT: ICD-10-CM

## 2020-07-10 DIAGNOSIS — M25.561 RECURRENT PAIN OF RIGHT KNEE: Primary | ICD-10-CM

## 2020-07-10 DIAGNOSIS — R26.2 DIFFICULTY WALKING: ICD-10-CM

## 2020-07-10 PROCEDURE — 97112 NEUROMUSCULAR REEDUCATION: CPT

## 2020-07-10 PROCEDURE — 97110 THERAPEUTIC EXERCISES: CPT

## 2020-07-10 PROCEDURE — 97140 MANUAL THERAPY 1/> REGIONS: CPT

## 2020-07-10 NOTE — PROGRESS NOTES
Daily Note     Today's date: 7/10/2020  Patient name: You Magallon  : [id number]  MRN: [id number]  Referring provider: Anthony Ramos MD  Dx:   Encounter Diagnosis     ICD-10-CM    1  Recurrent pain of right knee M25 561    2  Difficulty walking R26 2    3  Generalized muscle weakness M62 81    4  Status post total right knee replacement Z96 651                   Subjective: Patient states he is doing well today  Objective: See treatment diary below      Assessment: Tolerated treatment well  Patient arrived to clinic carrying his cane, presents with steady gait without the use of an assistive device, therefore instructed to leave cane at home  Progressed patient in weights and reps this visit as tolerated  He continues to need frequent verbal instruction for correct technique  Introduced Radha with good form and minimal cues for correction  Concluded with ice  Patient demonstrated fatigue post treatment and would benefit from continued PT      Plan: Progress treatment as tolerated  HEP: quad sets, heel slides    Specialty Daily Treatment Diary       Manual 7/1 7/2 7/6 7/8 7/10   R pat mobs; R TF jt AP glides RA mobs RA mobs RA RA pat mob    R knee Flex/Ext stretch RA (supine knee ext and sitting knee flexion) RA (supine knee ext and sitting knee flexion) RA RA supine knee ext sitting knee flexion    R HS, baljit, calf, piriformis stretch        IASTM;  Tiger tail                Exercise Diary         RB (toggle -> full) 5' toggle 5' toggle 5' toggle 5' full ROM 6'    Quad sets c MH c bolster -> NMES c QS 5 sec x 20 towel roll underneath 5 sec x 20 towel roll underneath 5 sec x 20 towel roll underneath 5 sec x 20 towel roll  5 sec x 20   Towel roll   SLR (flex, ER c NMES) 0# 3x10 flex only 1# 3x10 flex only 1 5# 2x10 flex and ER 1 5# 3x10 flex and ER 2# 3x10 flex and ER   SAQ/LAQ c towel roll (NMES) 0# 30x (SAQ and LAQ) 1# 30 1 5# 30x  1 5# 30x 2# 30x   Supine Heel Slides c SOS 3" x20 3" x 20 3" x 20 3" x20 3" x 20   Prone quad stretch 20"x3  NV      Prone H' Ext        TB Bridges/ Clamshells  OTB clams hooklying 20x GTB clams hooklying 20x GTB clam hooklying 20x GTB clam hooklying 30x           Seated HS stretch        Seated Knee Flex stretch c AAROM from LLE  At edge of table: 5 sec x 20 AAROM At edge of table: 5 sec x 20 AAROM NV At edge of table: 5 sec x 20 AAROM           St HR/TR 30 HR/TR 30 HR/TR 30 HR/TR 30 HR/TR 30 HR/TR   Marching; H' ABD; Ext; HS Curls 30 marching at bar 30 marching at bar 30 marching at bar 1 5#  30 marching at bar 1 5# 20 marching at bar 2#    Gastroc stretch on slant board Only toes on slant board 20 sec x 3 Only toes on slant board 20 sec x 3 NV     Squats at bar with chair 3x10 3x10 3x10 3x10 3x10   Step downs   4" 2x10 left hand on walker R hand on bar 4" 2x10  4" 2x 4" 3x10   Lateral step ups   4" step 2x10 6" step 2x10  6" step 3x10 6" step 3x10   Step Ups;  4") 3x10 (step up bar on left hand and R hand on RW focus on march) 4") 3x10 (step up bar on left hand and R hand on RW focus on march) 6" step 2x10  6" step 3x10 6" step 3x10    SLS 15"x3 on floor 15" x 3 on floor 15"x 3 on floor NV  15"x3 on floor           TB -> [place] TKE     BTB 10"x15   TB 3 way H'  OTB 10x ea direction OTB 10x ea direction GTB 2x10 ea direction GTB 2x10 ea Direction GTB 3x10 ea direction   Lat Band Walks OTB 3 laps OTB 3 laps GTB 3 laps GTB 3 laps GTB 3 laps   Leg Press        Gait Training  50 ft x 4 using RW focusing on marching for knee flexion 50ft x 4 using SPC on marching for knee flexion np  DC    EDU/HEP        Modalities        MH        CP/Game Ready 10 min game ready post   elevation   10 min game read post elevation 10 min ice

## 2020-07-13 ENCOUNTER — OFFICE VISIT (OUTPATIENT)
Dept: PHYSICAL THERAPY | Facility: CLINIC | Age: 75
End: 2020-07-13
Payer: COMMERCIAL

## 2020-07-13 DIAGNOSIS — M25.561 RECURRENT PAIN OF RIGHT KNEE: Primary | ICD-10-CM

## 2020-07-13 DIAGNOSIS — Z96.651 STATUS POST TOTAL RIGHT KNEE REPLACEMENT: ICD-10-CM

## 2020-07-13 DIAGNOSIS — R26.2 DIFFICULTY WALKING: ICD-10-CM

## 2020-07-13 DIAGNOSIS — M62.81 GENERALIZED MUSCLE WEAKNESS: ICD-10-CM

## 2020-07-13 PROCEDURE — 97112 NEUROMUSCULAR REEDUCATION: CPT

## 2020-07-13 PROCEDURE — 97110 THERAPEUTIC EXERCISES: CPT

## 2020-07-13 PROCEDURE — 97140 MANUAL THERAPY 1/> REGIONS: CPT

## 2020-07-13 NOTE — PROGRESS NOTES
Daily Note     Today's date: 2020  Patient name: Odette Ortiz  : [birth year]  MRN: [id number]  Referring provider: Elsa Segal MD  Dx:   Encounter Diagnosis     ICD-10-CM    1  Recurrent pain of right knee M25 561    2  Status post total right knee replacement Z96 651    3  Generalized muscle weakness M62 81    4  Difficulty walking R26 2                   Subjective: Patient states he is doing well this morning  Objective: See treatment diary below      Assessment: Tolerated treatment well  Initiated POC on RB as warm up  Patient is demonstrating great strides toward his goals  Progressed SLS on foam demonstrating increase challenge  Introduced standing hamstring curls, abduction and extension with cuff weight  Patient continues to make improvements into ROM  Patient demonstrated fatigue post treatment, exhibited good technique with therapeutic exercises and would benefit from continued PT      Plan: Continue per plan of care  HEP: quad sets, heel slides    Specialty Daily Treatment Diary       Manual 7/13 7/2 7/6 7/8 7/10   R pat mobs; R TF jt AP glides RA mobs RA mobs RA RA pat mob RA   R knee Flex/Ext stretch RA RA (supine knee ext and sitting knee flexion) RA RA supine knee ext sitting knee flexion RA   R HS, baljit, calf, piriformis stretch        IASTM;  Tiger tail                Exercise Diary         RB (toggle -> full) 6'  5' toggle 5' toggle 5' full ROM 6'    Quad sets c MH c bolster -> NMES c QS 5 sec x 20  Towel roll 5 sec x 20 towel roll underneath 5 sec x 20 towel roll underneath 5 sec x 20 towel roll  5 sec x 20   Towel roll   SLR (flex, ER c NMES)  1# 3x10 flex only 1 5# 2x10 flex and ER 1 5# 3x10 flex and ER 2# 3x10 flex and ER   SAQ/LAQ c towel roll (NMES) 2 5# 30x 1# 30 1 5# 30x  1 5# 30x 2# 30x   Supine Heel Slides c SOS 3"x30  3" x 20 3" x 20  3" x20 3" x 20   Prone quad stretch  NV      Prone H' Ext        TB Bridges/ Clamshells  OTB clams hooklying 20x GTB clams hooklying 20x GTB clam hooklying 20x GTB clam hooklying 30x           Seated HS stretch        Seated Knee Flex stretch c AAROM from LLE At edge of table: 5 sec x 20 AAROM At edge of table: 5 sec x 20 AAROM At edge of table: 5 sec x 20 AAROM NV At edge of table: 5 sec x 20 AAROM           St HR/TR 30 HR/TR 30 HR/TR 30 HR/TR 30 HR/TR 30 HR/TR   Marching; H' ABD; Ext; HS Curls 30 marching at bar 2 5#    20 HS curls 30 marching at bar 30 marching at bar 1 5#  30 marching at bar 1 5# 20 marching at bar 2#    Gastroc stretch on slant board  Only toes on slant board 20 sec x 3 NV     Squats at bar with chair 30x  3x10 3x10 3x10 3x10   Step downs  4" 3x10 4" 2x10 left hand on walker R hand on bar 4" 2x10  4" 2x 4" 3x10   Lateral step ups  6" step 3x10 4" step 2x10 6" step 2x10  6" step 3x10 6" step 3x10   Step Ups;  6" step 3x10 4") 3x10 (step up bar on left hand and R hand on RW focus on march) 6" step 2x10  6" step 3x10 6" step 3x10    SLS 20"x2  Foam  15" x 3 on floor 15"x 3 on floor NV  15"x3 on floor           TB -> [place] TKE BTB 10"x20     BTB 10"x15   TB 3 way H'  GTB 3x10 ea direction OTB 10x ea direction GTB 2x10 ea direction GTB 2x10 ea Direction GTB 3x10 ea direction   Lat Band Walks GTB 3 laps OTB 3 laps GTB 3 laps GTB 3 laps GTB 3 laps   Leg Press        Gait Training   50ft x 4 using SPC on marching for knee flexion np  DC    EDU/HEP        Modalities        MH        CP/Game Ready    10 min game read post elevation 10 min ice

## 2020-07-15 ENCOUNTER — OFFICE VISIT (OUTPATIENT)
Dept: PHYSICAL THERAPY | Facility: CLINIC | Age: 75
End: 2020-07-15
Payer: COMMERCIAL

## 2020-07-15 DIAGNOSIS — M62.81 GENERALIZED MUSCLE WEAKNESS: ICD-10-CM

## 2020-07-15 DIAGNOSIS — Z96.651 STATUS POST TOTAL RIGHT KNEE REPLACEMENT: ICD-10-CM

## 2020-07-15 DIAGNOSIS — M25.561 RECURRENT PAIN OF RIGHT KNEE: Primary | ICD-10-CM

## 2020-07-15 DIAGNOSIS — R26.2 DIFFICULTY WALKING: ICD-10-CM

## 2020-07-15 PROCEDURE — 97140 MANUAL THERAPY 1/> REGIONS: CPT

## 2020-07-15 PROCEDURE — 97110 THERAPEUTIC EXERCISES: CPT

## 2020-07-15 PROCEDURE — 97112 NEUROMUSCULAR REEDUCATION: CPT

## 2020-07-17 ENCOUNTER — OFFICE VISIT (OUTPATIENT)
Dept: PHYSICAL THERAPY | Facility: CLINIC | Age: 75
End: 2020-07-17
Payer: COMMERCIAL

## 2020-07-17 DIAGNOSIS — M25.561 RECURRENT PAIN OF RIGHT KNEE: Primary | ICD-10-CM

## 2020-07-17 DIAGNOSIS — R26.2 DIFFICULTY WALKING: ICD-10-CM

## 2020-07-17 DIAGNOSIS — Z96.651 STATUS POST TOTAL RIGHT KNEE REPLACEMENT: ICD-10-CM

## 2020-07-17 DIAGNOSIS — M62.81 GENERALIZED MUSCLE WEAKNESS: ICD-10-CM

## 2020-07-17 PROCEDURE — 97140 MANUAL THERAPY 1/> REGIONS: CPT

## 2020-07-17 PROCEDURE — 97110 THERAPEUTIC EXERCISES: CPT

## 2020-07-17 PROCEDURE — 97112 NEUROMUSCULAR REEDUCATION: CPT

## 2020-07-17 NOTE — PROGRESS NOTES
Daily Note     Today's date: 2020  Patient name: Cristobal Costa  :   MRN: [id number]  Referring provider: Rosemary Herzog MD  Dx:   Encounter Diagnosis     ICD-10-CM    1  Recurrent pain of right knee M25 561    2  Status post total right knee replacement Z96 651    3  Generalized muscle weakness M62 81    4  Difficulty walking R26 2                   Subjective: Patient states he is doing well  Reports doctors visit went very well and his doctor said he only needs one more week of therapy  Objective: See treatment diary below      Assessment: Tolerated treatment well  Continued to progress patient through exercises  Introduced leg press with good tolerance  Patient is still lacking TKE and could benefit from continued therapy in order to gain full ROM  Educated patient on discharging too soon and possible regression  Encouraged patient to continue to attending therapy for at least 1x/week, he said he would think about it over the weekend  Concluded with MHP in supine on bolster to focus on TKE  Patient demonstrated fatigue post treatment, exhibited good technique with therapeutic exercises and would benefit from continued PT      Plan: Continue per plan of care  HEP: quad sets, heel slides    Specialty Daily Treatment Diary       Manual 7/13 7/15 7/17 7/8 7/10   R pat mobs; R TF jt AP glides RA mobs RA  RA RA pat mob RA   R knee Flex/Ext stretch RA RA RA RA supine knee ext sitting knee flexion RA   R HS, baljit, calf, piriformis stretch        IASTM;  Tiger tail                Exercise Diary         RB (toggle -> full) 6'  6' 6' 5' full ROM 6'    Quad sets c MH c bolster -> NMES c QS 5 sec x 20  Towel roll 5 sec x 20 towel roll  5 sec x 30 towel roll 5 sec x 20 towel roll  5 sec x 20   Towel roll   SLR (flex, ER c NMES) 2# 3x10 flex and ER 2 5#3x10 flex and ER 3# 3x10 flex  And Er 1 5# 3x10 flex and ER 2# 3x10 flex and ER   SAQ/LAQ c towel roll (NMES) 2 5# 30x 2 5# 30 3# 30 1 5# 30x 2# 30x   Supine Heel Slides c SOS 3"x30  3" x30   3" x20 3" x 20   Prone quad stretch        Prone H' Ext        TB Bridges/ Clamshells GTB clam hooklying 30x BTB clam   hooklying 30x BTB clam hooklying 30x GTB clam hooklying 20x GTB clam hooklying 30x           Seated HS stretch        Seated Knee Flex stretch c AAROM from LLE At edge of table: 5 sec x 20 AAROM At edge of table: 5 sec x 20 AAROM NP NV At edge of table: 5 sec x 20 AAROM           St HR/TR 30 HR/TR 30 HR/TR 30 HR/TR 30 HR/TR 30 HR/TR   Marching; H' ABD; Ext; HS Curls 30 marching at bar 2 5#    20 HS curls 30 marching at bar 2 5#    20 HS curls 30 marching at bar 2 5#    20 HS curls 30 marching at bar 1 5# 20 marching at bar 2#    TKE on bolster with 5#        Squats at bar with chair 30x  30x GTB 2x10 3x10 3x10   Step downs  4" 3x10 6" 2x10  6" 2x10 4" 2x 4" 3x10   Lateral step ups  6" step 3x10 8" step 2x10 8" step 2x10 6" step 3x10 6" step 3x10   Step Ups;  6" step 3x10 8" step 2x10 8" step 2x10 6" step 3x10 6" step 3x10    SLS 20"x2  Foam  20"x3 on foam NV NV  15"x3 on floor           TB -> [place] TKE BTB 10"x20  BTB 10"x20 BTB 10"x 30  BTB 10"x15   TB 3 way H'  GTB 3x10 ea direction BTB 2x10 ea direction BTB 2x10 ea direction GTB 2x10 ea Direction GTB 3x10 ea direction   Lat Band Walks GTB 3 laps BTB 3 laps BTB 3 laps GTB 3 laps GTB 3 laps   Leg Press  NV? 30# 2x10     Gait Training     DC    EDU/HEP        Modalities        MH        CP/Game Ready   10 min HP on bolster under ankles 10 min game read post elevation 10 min ice

## 2020-07-20 ENCOUNTER — OFFICE VISIT (OUTPATIENT)
Dept: PHYSICAL THERAPY | Facility: CLINIC | Age: 75
End: 2020-07-20
Payer: COMMERCIAL

## 2020-07-20 DIAGNOSIS — M25.561 RECURRENT PAIN OF RIGHT KNEE: Primary | ICD-10-CM

## 2020-07-20 DIAGNOSIS — R26.2 DIFFICULTY WALKING: ICD-10-CM

## 2020-07-20 DIAGNOSIS — Z96.651 STATUS POST TOTAL RIGHT KNEE REPLACEMENT: ICD-10-CM

## 2020-07-20 DIAGNOSIS — M62.81 GENERALIZED MUSCLE WEAKNESS: ICD-10-CM

## 2020-07-20 PROCEDURE — 97140 MANUAL THERAPY 1/> REGIONS: CPT

## 2020-07-20 PROCEDURE — 97112 NEUROMUSCULAR REEDUCATION: CPT

## 2020-07-20 PROCEDURE — 97110 THERAPEUTIC EXERCISES: CPT

## 2020-07-20 NOTE — PROGRESS NOTES
Daily Note     Today's date: 2020  Patient name: Kayleen Maxwell  :   MRN: [id number]  Referring provider: Robert Ruiz MD  Dx:   Encounter Diagnosis     ICD-10-CM    1  Recurrent pain of right knee M25 561    2  Status post total right knee replacement Z96 651    3  Generalized muscle weakness M62 81    4  Difficulty walking R26 2                   Subjective: Patient states he is doing well today  Reports he thought about it over the weekend and would like to do 1x per week as suggested by therapists  Objective: See treatment diary below      Assessment: Tolerated treatment well  Continued to progress patient through  [address]  Increased reps as well as introduced [place] TKE to focus on improving ROM into knee extension  For quad sets, progressed to bolster to be able to gain more motion, tolerated well  Will continue to progress as able  Patient demonstrated fatigue post treatment, exhibited good technique with therapeutic exercises and would benefit from continued PT      Plan: Continue per plan of care  HEP: quad sets, heel slides    Specialty Daily Treatment Diary       Manual 7/13 7/15 7/17 7/20 7/10   R pat mobs; R TF jt AP glijohan RA mobs RA  RA RA RA   R knee Flex/Ext stretch RA RA RA RA RA   R HS, baljit, calf, piriformis stretch        IASTM;  Tiger tail                Exercise Diary         RB (toggle -> full) 6'  6' 6' 6    6'    Quad sets c MH c bolster -> NMES c QS 5 sec x 20  Towel roll 5 sec x 20 towel roll  5 sec x 30 towel roll 5 sec x 30 bolster  5 sec x 20   Towel roll   SLR (flex, ER c NMES) 2# 3x10 flex and ER 2 5#3x10 flex and ER 3# 3x10 flex  And Er 3# 3x 10 and flex  ER  2# 3x10 flex and ER   SAQ/LAQ c towel roll (NMES) 2 5# 30x 2 5# 30 3# 30 3# 30 2# 30x   Supine Heel Slides c SOS 3"x30  3" x30  3" 30x 3" 30x  3" x 20   Prone quad stretch        Prone H' Ext        TB Bridges/ Clamshells GTB clam hooklying 30x BTB clam   hooklying 30x BTB clam hooklying 30x DC GTB clam hooklying 30x           Seated HS stretch        Seated Knee Flex stretch c AAROM from LLE At edge of table: 5 sec x 20 AAROM At edge of table: 5 sec x 20 AAROM NP DC At edge of table: 5 sec x 20 AAROM           St HR/TR 30 HR/TR 30 HR/TR 30 HR/TR DC 30 HR/TR   Marching; H' ABD; Ext; HS Curls 30 marching at bar 2 5#    20 HS curls 30 marching at bar 2 5#    20 HS curls 30 marching at bar 2 5#    20 HS curls DC  20 marching at bar 2#    TKE on bolster with 5#   2 min 5# 3 min 5#    Squats at bar with chair 30x  30x GTB 2x10 DC 3x10   Step downs  4" 3x10 6" 2x10  6" 2x10 6" 3x10 4" 3x10   Lateral step ups  6" step 3x10 8" step 2x10 8" step 2x10 8" step 3x10 6" step 3x10   Step Ups;  6" step 3x10 8" step 2x10 8" step 2x10 8" step 3x10 6" step 3x10    SLS 20"x2  Foam  20"x3 on foam NV DC 15"x3 on floor           TB -> [place] TKE BTB 10"x20  BTB 10"x20 BTB 10"x 30 Mima 12# 10" x 15 BTB 10"x15   TB 3 way H'  GTB 3x10 ea direction BTB 2x10 ea direction BTB 2x10 ea direction BTB 3x10 ea direction GTB 3x10 ea direction   Lat Band Walks GTB 3 laps BTB 3 laps BTB 3 laps BTB 3 laps  GTB 3 laps   Leg Press  NV? 30# 2x10 30# 3x10    Gait Training         EDU/HEP        Modalities        MH        CP/Game Ready   10 min HP on bolster under ankles 10 min HP on bolster under ankles 10 min ice

## 2020-07-22 ENCOUNTER — OFFICE VISIT (OUTPATIENT)
Dept: PHYSICAL THERAPY | Facility: CLINIC | Age: 75
End: 2020-07-22
Payer: COMMERCIAL

## 2020-07-22 DIAGNOSIS — Z96.651 STATUS POST TOTAL RIGHT KNEE REPLACEMENT: ICD-10-CM

## 2020-07-22 DIAGNOSIS — M62.81 GENERALIZED MUSCLE WEAKNESS: ICD-10-CM

## 2020-07-22 DIAGNOSIS — M25.561 RECURRENT PAIN OF RIGHT KNEE: Primary | ICD-10-CM

## 2020-07-22 DIAGNOSIS — R26.2 DIFFICULTY WALKING: ICD-10-CM

## 2020-07-22 PROCEDURE — 97140 MANUAL THERAPY 1/> REGIONS: CPT

## 2020-07-22 PROCEDURE — 97110 THERAPEUTIC EXERCISES: CPT

## 2020-07-22 NOTE — PROGRESS NOTES
Daily Note     Today's date: 2020  Patient name: Mary Decker  :   MRN: [id number]  Referring provider: Shelton Jolly MD  Dx:   Encounter Diagnosis     ICD-10-CM    1  Recurrent pain of right knee M25 561    2  Status post total right knee replacement Z96 651    3  Generalized muscle weakness M62 81    4  Difficulty walking R26 2                   Subjective: Patient states he is doing well  Objective: See treatment diary below      Assessment: Tolerated treatment well  Initiated POC on RB as warmup  Progressed patient in weight on leg press and [place] TKE  Patient is improving with ROM in to active knee extension  Will continue to progress as able  Patient demonstrated fatigue post treatment, exhibited good technique with therapeutic exercises and would benefit from continued PT      Plan: Continue per plan of care  Will take measurements next visit  HEP: quad sets, heel slides    Specialty Daily Treatment Diary       Manual 7/13 7/15 7/17 7/20 7/22   R pat mobs; R TF jt AP eliana RA mobs RA  RA RA    R knee Flex/Ext stretch RA RA RA RA    R HS, baljit, calf, piriformis stretch        IASTM;  Tiger tail                Exercise Diary         RB (toggle -> full) 6'  6' 6' 6    6'    Quad sets c MH c bolster -> NMES c QS 5 sec x 20  Towel roll 5 sec x 20 towel roll  5 sec x 30 towel roll 5 sec x 30 bolster  Leg on chair 5" 30x    SLR (flex, ER c NMES) 2# 3x10 flex and ER 2 5#3x10 flex and ER 3# 3x10 flex  And Er 3# 3x 10 and flex  ER  3# 3x 10 and flex  ER    SAQ/LAQ c towel roll (NMES) 2 5# 30x 2 5# 30 3# 30 3# 30 3# 30   Supine Heel Slides c SOS 3"x30  3" x30  3" 30x 3" 30x  3" 30x    Prone quad stretch        Prone H' Ext        TB Bridges/ Clamshells GTB clam hooklying 30x BTB clam   hooklying 30x BTB clam hooklying 30x DC            Seated HS stretch        Seated Knee Flex stretch c AAROM from LLE At edge of table: 5 sec x 20 AAROM At edge of table: 5 sec x 20 AAROM NP DC St HR/TR 30 HR/TR 30 HR/TR 30 HR/TR DC    Marching; H' ABD; Ext; HS Curls 30 marching at bar 2 5#    20 HS curls 30 marching at bar 2 5#    20 HS curls 30 marching at bar 2 5#    20 HS curls DC     TKE on bolster with 5#   2 min 5# 3 min 5# 3 min 5#    Squats at bar with chair 30x  30x GTB 2x10 DC    Step downs  4" 3x10 6" 2x10  6" 2x10 6" 3x10 6" 3x10    Lateral step ups  6" step 3x10 8" step 2x10 8" step 2x10 8" step 3x10 8" step 3x10    Step Ups;  6" step 3x10 8" step 2x10 8" step 2x10 8" step 3x10 8" step 3x10    SLS 20"x2  Foam  20"x3 on foam NV DC            TB -> [place] TKE BTB 10"x20  BTB 10"x20 BTB 10"x 30 Mima 12# 10" x 15 Mima 15 10"x 20    TB 3 way H'  GTB 3x10 ea direction BTB 2x10 ea direction BTB 2x10 ea direction BTB 3x10 ea direction BTB 3 x10 ea direction    Lat Band Walks GTB 3 laps BTB 3 laps BTB 3 laps BTB 3 laps  BTB 3 laps   Leg Press  NV? 30# 2x10 30# 3x10 35# 3x10    Gait Training         EDU/HEP        Modalities        MH        CP/Game Ready   10 min HP on bolster under ankles 10 min HP on bolster under ankles 10 min HP on bolster under ankles

## 2020-07-24 ENCOUNTER — OFFICE VISIT (OUTPATIENT)
Dept: PHYSICAL THERAPY | Facility: CLINIC | Age: 75
End: 2020-07-24
Payer: COMMERCIAL

## 2020-07-24 DIAGNOSIS — Z96.651 STATUS POST TOTAL RIGHT KNEE REPLACEMENT: ICD-10-CM

## 2020-07-24 DIAGNOSIS — M25.561 RECURRENT PAIN OF RIGHT KNEE: Primary | ICD-10-CM

## 2020-07-24 DIAGNOSIS — M62.81 GENERALIZED MUSCLE WEAKNESS: ICD-10-CM

## 2020-07-24 DIAGNOSIS — R26.2 DIFFICULTY WALKING: ICD-10-CM

## 2020-07-24 PROCEDURE — 97140 MANUAL THERAPY 1/> REGIONS: CPT

## 2020-07-24 PROCEDURE — 97110 THERAPEUTIC EXERCISES: CPT

## 2020-07-24 NOTE — PROGRESS NOTES
Daily Note     Today's date: 2020  Patient name: Latoya Donohue  :   MRN: [id number]  Referring provider: Valerie Andrews MD  Dx:   Encounter Diagnosis     ICD-10-CM    1  Recurrent pain of right knee M25 561    2  Status post total right knee replacement Z96 651    3  Generalized muscle weakness M62 81    4  Difficulty walking R26 2                   Subjective: Patient states he continues to feel good  Objective: See treatment diary below      Assessment: Tolerated treatment well  Continued to progress patient through exercises this visit  Patient will be dropping down to 1x/week starting next week  AROM measured at 6 degrees of extension and 2 degrees of PROM  Knee flexion measured at 125 deg AROM and 132 deg PROM   Encouraged patient to keep working on TKE at home  Patient exhibited good technique with therapeutic exercises and would benefit from continued PT      Plan: Progress treatment as tolerated  HEP: quad sets, heel slides    Specialty Daily Treatment Diary       Manual 7/24 7/15 7/17 7/20 7/22   R pat mobs; R TF jt AP eliana PERDUE mobs RA  RA RA Ra   R knee Flex/Ext stretch RA RA RA RA RA   R HS, baljit, calf, piriformis stretch        IASTM;  Tiger tail                Exercise Diary         RB (toggle -> full) 6'  6' 6' 6    6'    Quad sets c MH c bolster -> NMES c QS Leg on chair 5" 30x 5 sec x 20 towel roll  5 sec x 30 towel roll 5 sec x 30 bolster  Leg on chair 5" 30x    SLR (flex, ER c NMES) 3# 3x 10 and flex  ER  2 5#3x10 flex and ER 3# 3x10 flex  And Er 3# 3x 10 and flex  ER  3# 3x 10 and flex  ER    SAQ/LAQ c towel roll (NMES) 3# 30x LAQ 2 5# 30 3# 30 3# 30 3# 30   Supine Heel Slides c SOS  3" x30  3" 30x 3" 30x  3" 30x    Prone quad stretch        Prone H' Ext        TB Bridges/ Clamshells  BTB clam   hooklying 30x BTB clam hooklying 30x DC            Seated HS stretch        Seated Knee Flex stretch c AAROM from LLE  At edge of table: 5 sec x 20 AAROM NP DC St HR/TR  30 HR/TR 30 HR/TR DC    Marching; H' ABD; Ext; HS Curls  30 marching at bar 2 5#    20 HS curls 30 marching at bar 2 5#    20 HS curls DC     TKE on bolster with 5# 3 min 5#   2 min 5# 3 min 5# 3 min 5#    Squats at bar with chair  30x GTB 2x10 DC    Step downs  6" 3x10 6" 2x10  6" 2x10 6" 3x10 6" 3x10    Lateral step ups  8" step 3x10 8" step 2x10 8" step 2x10 8" step 3x10 8" step 3x10    Step Ups;  8" step 3x10 8" step 2x10 8" step 2x10 8" step 3x10 8" step 3x10    SLS  20"x3 on foam NV DC            TB -> [place] TKE Mima 15# 10"x20 BTB 10"x20 BTB 10"x 30 [place] 12# 10" x 15 Mima 15 10"x 20    TB 3 way H'  BTB 3x10 ea direction BTB 2x10 ea direction BTB 2x10 ea direction BTB 3x10 ea direction BTB 3 x10 ea direction    Lat Band Walks BTB 3 laps BTB 3 laps BTB 3 laps BTB 3 laps  BTB 3 laps   Leg Press 55# 3x10 NV? 30# 2x10 30# 3x10 35# 3x10    Gait Training         EDU/HEP        Modalities        MH        CP/Game Ready 10 min HP on bolster under ankles  10 min HP on bolster under ankles 10 min HP on bolster under ankles 10 min HP on bolster under ankles

## 2020-07-27 ENCOUNTER — APPOINTMENT (OUTPATIENT)
Dept: PHYSICAL THERAPY | Facility: CLINIC | Age: 75
End: 2020-07-27
Payer: COMMERCIAL

## 2020-07-29 ENCOUNTER — APPOINTMENT (OUTPATIENT)
Dept: PHYSICAL THERAPY | Facility: CLINIC | Age: 75
End: 2020-07-29
Payer: COMMERCIAL

## 2020-07-31 ENCOUNTER — OFFICE VISIT (OUTPATIENT)
Dept: PHYSICAL THERAPY | Facility: CLINIC | Age: 75
End: 2020-07-31
Payer: COMMERCIAL

## 2020-07-31 DIAGNOSIS — M62.81 GENERALIZED MUSCLE WEAKNESS: ICD-10-CM

## 2020-07-31 DIAGNOSIS — R26.2 DIFFICULTY WALKING: ICD-10-CM

## 2020-07-31 DIAGNOSIS — M25.561 RECURRENT PAIN OF RIGHT KNEE: Primary | ICD-10-CM

## 2020-07-31 DIAGNOSIS — Z96.651 STATUS POST TOTAL RIGHT KNEE REPLACEMENT: ICD-10-CM

## 2020-07-31 PROCEDURE — 97110 THERAPEUTIC EXERCISES: CPT

## 2020-07-31 PROCEDURE — 97112 NEUROMUSCULAR REEDUCATION: CPT

## 2020-07-31 NOTE — PROGRESS NOTES
Daily Note     Today's date: 2020  Patient name: aSmir Weiss  :   MRN: [id number]  Referring provider: Alonso Coel MD  Dx:   Encounter Diagnosis     ICD-10-CM    1  Recurrent pain of right knee M25 561    2  Status post total right knee replacement Z96 651    3  Generalized muscle weakness M62 81    4  Difficulty walking R26 2                   Subjective: Patient states he heard a clicking when going up the stairs but denied pain with it  Notes he feels overall good  Objective: See treatment diary below      Assessment: Tolerated treatment well  Continue to make progressions with patient with good tolerance  PROM measured at 0 degrees extension and 130 degrees flexion  AROM measured at 3 degrees extension and 126 degrees flexion  Will trial treadmill retro walk next visit  Patient demonstrated fatigue post treatment, exhibited good technique with therapeutic exercises and would benefit from continued PT      Plan: Continue per plan of care  HEP: quad sets, heel slides    Specialty Daily Treatment Diary       Manual    R alexia andrews; R TF jt AP eliana PERDUE mobs RA  RA RA Ra   R knee Flex/Ext stretch RA RA RA RA RA   R HS, baljit, calf, piriformis stretch        IASTM;  Tiger tail                Exercise Diary         TM retro  6'  6' TREADMILL 6    6'    Quad sets c MH c bolster -> NMES c QS Leg on chair 5" 30x NV 5 sec x 30 towel roll 5 sec x 30 bolster  Leg on chair 5" 30x    SLR (flex, ER c NMES) 3# 3x 10 and flex  ER  4# 3x10 and flex, ER 3# 3x10 flex  And Er 3# 3x 10 and flex  ER  3# 3x 10 and flex  ER    SAQ/LAQ c towel roll (NMES) 3# 30x LAQ 4# 30x LAQ 3# 30 3# 30 3# 30   Supine Heel Slides c SOS   3" 30x 3" 30x  3" 30x    Prone quad stretch        Prone H' Ext        TB Bridges/ Clamshells   BTB clam hooklying 30x DC            Seated HS stretch        Seated Knee Flex stretch c AAROM from LLE   NP DC            St HR/TR   30 HR/TR DC    Marching; H' ABD; Ext; HS Curls   30 marching at bar 2 5#    20 HS curls DC     TKE on bolster with 5# 3 min 5#  3 min 5#  2 min 5# 3 min 5# 3 min 5#    Squats at bar with chair   GTB 2x10 DC    Step downs  6" 3x10 6" 3x10  6" 2x10 6" 3x10 6" 3x10    Lateral step ups  8" step 3x10 8" step with green foam 3x10 8" step 2x10 8" step 3x10 8" step 3x10    Step Ups;  8" step 3x10 8" step with green foam 3x10  8" step 2x10 8" step 3x10 8" step 3x10    SLS   NV DC            TB -> [place] TKE [place]ima 15# 10"x20 [place] 20#  10"x20 BTB 10"x 30 [place] 12# 10" x 15 [place] 15 10"x 20    TB 3 way H'  BTB 3x10 ea direction BTB 3x10 ea direction BTB 2x10 ea direction BTB 3x10 ea direction BTB 3 x10 ea direction    Lat Band Walks BTB 3 laps BTB 3 laps  BTB 3 laps BTB 3 laps  BTB 3 laps   Leg Press 55# 3x10 55# 3x10 30# 2x10 30# 3x10 35# 3x10    Gait Training         EDU/HEP        Modalities        MH        CP/Game Ready 10 min HP on bolster under ankles  10 min HP on bolster under ankles 10 min HP on bolster under ankles 10 min HP on bolster under ankles

## 2020-08-03 ENCOUNTER — APPOINTMENT (OUTPATIENT)
Dept: PHYSICAL THERAPY | Facility: CLINIC | Age: 75
End: 2020-08-03
Payer: COMMERCIAL

## 2020-08-05 ENCOUNTER — APPOINTMENT (OUTPATIENT)
Dept: PHYSICAL THERAPY | Facility: CLINIC | Age: 75
End: 2020-08-05
Payer: COMMERCIAL

## 2020-08-07 ENCOUNTER — OFFICE VISIT (OUTPATIENT)
Dept: PHYSICAL THERAPY | Facility: CLINIC | Age: 75
End: 2020-08-07
Payer: COMMERCIAL

## 2020-08-07 DIAGNOSIS — M62.81 GENERALIZED MUSCLE WEAKNESS: ICD-10-CM

## 2020-08-07 DIAGNOSIS — Z96.651 STATUS POST TOTAL RIGHT KNEE REPLACEMENT: ICD-10-CM

## 2020-08-07 DIAGNOSIS — R26.2 DIFFICULTY WALKING: ICD-10-CM

## 2020-08-07 DIAGNOSIS — M25.561 RECURRENT PAIN OF RIGHT KNEE: Primary | ICD-10-CM

## 2020-08-07 PROCEDURE — 97112 NEUROMUSCULAR REEDUCATION: CPT

## 2020-08-07 PROCEDURE — 97110 THERAPEUTIC EXERCISES: CPT

## 2020-08-07 NOTE — PROGRESS NOTES
Daily Note     Today's date: 2020  Patient name: Ninfa Marrero  : [id number]  MRN: [id number]  Referring provider: David Sosa MD  Dx:   Encounter Diagnosis     ICD-10-CM    1  Recurrent pain of right knee  M25 561    2  Status post total right knee replacement  Z96 [id number]    3  Generalized muscle weakness  M62 81    4  Difficulty walking  R26 2                   Subjective: Patient states he is doing really well  Objective: See treatment diary below      Assessment: Tolerated treatment well  Introduced retro walk on treadmill today and demonstrated challenge needing frequent verbal cues for correct technique  Patient continues to make improvements into ROM  He was able to get the same measurement of 130 deg into AROM and PROM  He still lacks a few degrees of normal knee extension  PROM 0 degrees of knee extension, 4 degrees of AROM  Patient demonstrated fatigue post treatment, exhibited good technique with therapeutic exercises and would benefit from continued PT      Plan: Continue per plan of care  HEP: quad sets, heel slides    Specialty Daily Treatment Diary       Manual    R pat darryl; R TF jt AP eliana andrews RA   RA Ra   R knee Flex/Ext stretch RA RA  RA RA   R HS, baljit, calf, piriformis stretch        IASTM;  Tiger tail                Exercise Diary         TM retro  6'  6' Treadmill 2'  6    6'    Quad sets c MH c bolster -> NMES c QS Leg on chair 5" 30x NV  5 sec x 30 bolster  Leg on chair 5" 30x    SLR (flex, ER c NMES) 3# 3x 10 and flex  ER  4# 3x10 and flex, ER 4# 3x10 and flex, ER 3# 3x 10 and flex  ER  3# 3x 10 and flex  ER    SAQ/LAQ c towel roll (NMES) 3# 30x LAQ 4# 30x LAQ 4# 30x LAQ 3# 30 3# 30   Supine Heel Slides c SOS    3" 30x  3" 30x    Prone quad stretch        Prone H' Ext        TB Bridges/ Clamshells    DC            Seated HS stretch        Seated Knee Flex stretch c AAROM from LLE    DC            St HR/TR    DC    Marching; H' ABD; Ext; HS Curls    DC     TKE on bolster with 5# 3 min 5#  3 min 5#  3 min 5#  3 min 5# 3 min 5#    Squats at bar with chair    DC    Step downs  6" 3x10 6" 3x10  6" 3x10 6" 3x10 6" 3x10    Lateral step ups  8" step 3x10 8" step with green foam 3x10 8" step with green foam 3x10 8" step 3x10 8" step 3x10    Step Ups;  8" step 3x10 8" step with green foam 3x10  8" step with green foam 3x10  8" step 3x10 8" step 3x10    SLS    DC            TB -> [place] TKE [place] 15# 10"x20 [place] 20#  10"x20 [place] 20#  10"x20 [place] 12# 10" x 15 [place] 15 10"x 20    TB 3 way H'  BTB 3x10 ea direction BTB 3x10 ea direction BTB 3x10 BTB 3x10 ea direction BTB 3 x10 ea direction    Lat Band Walks BTB 3 laps BTB 3 laps  BTB 3 laps BTB 3 laps  BTB 3 laps   Leg Press 55# 3x10 55# 3x10 60# 3x10  30# 3x10 35# 3x10    Gait Training         EDU/HEP        Modalities        MH        CP/Game Ready 10 min HP on bolster under ankles  10 min HP on bolster under ankles 10 min HP on bolster under ankles 10 min HP on bolster under ankles

## 2020-08-10 ENCOUNTER — APPOINTMENT (OUTPATIENT)
Dept: PHYSICAL THERAPY | Facility: CLINIC | Age: 75
End: 2020-08-10
Payer: COMMERCIAL

## 2020-08-12 ENCOUNTER — APPOINTMENT (OUTPATIENT)
Dept: PHYSICAL THERAPY | Facility: CLINIC | Age: 75
End: 2020-08-12
Payer: COMMERCIAL

## 2020-08-14 ENCOUNTER — OFFICE VISIT (OUTPATIENT)
Dept: PHYSICAL THERAPY | Facility: CLINIC | Age: 75
End: 2020-08-14
Payer: COMMERCIAL

## 2020-08-14 DIAGNOSIS — Z96.651 STATUS POST TOTAL RIGHT KNEE REPLACEMENT: ICD-10-CM

## 2020-08-14 DIAGNOSIS — M25.561 RECURRENT PAIN OF RIGHT KNEE: Primary | ICD-10-CM

## 2020-08-14 DIAGNOSIS — R26.2 DIFFICULTY WALKING: ICD-10-CM

## 2020-08-14 DIAGNOSIS — M62.81 GENERALIZED MUSCLE WEAKNESS: ICD-10-CM

## 2020-08-14 PROCEDURE — 97110 THERAPEUTIC EXERCISES: CPT

## 2020-08-14 NOTE — PROGRESS NOTES
Daily Note / Discharge    Today's date: 2020  Patient name: Lupe Romero  : [id number]  MRN: [id number]  Referring provider: Henri Anne MD  Dx:   Encounter Diagnosis     ICD-10-CM    1  Recurrent pain of right knee  M25 561    2  Status post total right knee replacement  Z96 [id number]    3  Generalized muscle weakness  M62 81    4  Difficulty walking  R26 2                   Subjective: Patient states he is doing very well  He denies having any pain with any activity  He reports he has been riding his bike around his neighborhood without difficulty  Objective: See treatment diary below      Assessment: Tolerated treatment well  Continued to progress patient through exercises  Patient has reached all goals at this point in therapy  AROM measured at 1 degree into knee extension and 0 into PROM  Knee extension measured at 125 degrees flexion AROM and 120 degrees PROM  Provided patient with print out of HEP and encouraged patient to continue with exercises to maintain ROM  Plan: Discharge      HEP: quad sets, heel slides    Specialty Daily Treatment Diary       Manual    R pat darryl; R TF jt AP eliana andrews RA  RA RA Ra   R knee Flex/Ext stretch RA RA RA RA RA   R HS, baljit, calf, piriformis stretch        IASTM;  Tiger tail                Exercise Diary         TM retro  6'  6' Treadmill 2'  np    6'    Quad sets c MH c bolster -> NMES c QS Leg on chair 5" 30x NV   Leg on chair 5" 30x    SLR (flex, ER c NMES) 3# 3x 10 and flex  ER  4# 3x10 and flex, ER 4# 3x10 and flex, ER 4# 3x10 and flex ER 3# 3x 10 and flex  ER    SAQ/LAQ c towel roll (NMES) 3# 30x LAQ 4# 30x LAQ 4# 30x LAQ 4# 30 LAQ 3# 30   Supine Heel Slides c SOS     3" 30x    Prone quad stretch        Prone H' Ext        TB Bridges/ Clamshells                Seated HS stretch        Seated Knee Flex stretch c AAROM from LLE                St HR/TR        Marching; H' ABD; Ext; HS Curls        TKE on bolster with 5# 3 min 5#  3 min 5#  3 min 5#  np  3 min 5#    Squats at bar with chair        Step downs  6" 3x10 6" 3x10  6" 3x10 6" 3x10  6" 3x10    Lateral step ups  8" step 3x10 8" step with green foam 3x10 8" step with green foam 3x10 8" step with green foam 3x10  8" step 3x10    Step Ups;  8" step 3x10 8" step with green foam 3x10  8" step with green foam 3x10  8" step with green foam 3x10  8" step 3x10    SLS                TB -> [place] TKE [place] 15# 10"x20 [place] 20#  10"x20 [place] 20#  10"x20 [place] 20# 10"x 20 [place] 15 10"x 20    TB 3 way H'  BTB 3x10 ea direction BTB 3x10 ea direction BTB 3x10 BTB 3x10  BTB 3 x10 ea direction    Lat Band Walks BTB 3 laps BTB 3 laps  BTB 3 laps BTB 3 laps BTB 3 laps   Leg Press 55# 3x10 55# 3x10 60# 3x10  70# 3x10  35# 3x10    Gait Training         EDU/HEP        Modalities                CP/Game Ready 10 min HP on bolster under ankles  10 min HP on bolster under ankles 10 min HP 10 min HP on bolster under ankles

## 2020-08-17 ENCOUNTER — APPOINTMENT (OUTPATIENT)
Dept: PHYSICAL THERAPY | Facility: CLINIC | Age: 75
End: 2020-08-17
Payer: COMMERCIAL

## 2020-08-19 ENCOUNTER — APPOINTMENT (OUTPATIENT)
Dept: PHYSICAL THERAPY | Facility: CLINIC | Age: 75
End: 2020-08-19
Payer: COMMERCIAL

## 2020-08-21 ENCOUNTER — APPOINTMENT (OUTPATIENT)
Dept: PHYSICAL THERAPY | Facility: CLINIC | Age: 75
End: 2020-08-21
Payer: COMMERCIAL

## 2020-08-24 ENCOUNTER — APPOINTMENT (OUTPATIENT)
Dept: PHYSICAL THERAPY | Facility: CLINIC | Age: 75
End: 2020-08-24
Payer: COMMERCIAL

## 2020-08-26 ENCOUNTER — APPOINTMENT (OUTPATIENT)
Dept: PHYSICAL THERAPY | Facility: CLINIC | Age: 75
End: 2020-08-26
Payer: COMMERCIAL

## 2020-08-28 ENCOUNTER — APPOINTMENT (OUTPATIENT)
Dept: PHYSICAL THERAPY | Facility: CLINIC | Age: 75
End: 2020-08-28
Payer: COMMERCIAL

## 2020-08-31 ENCOUNTER — APPOINTMENT (OUTPATIENT)
Dept: PHYSICAL THERAPY | Facility: CLINIC | Age: 75
End: 2020-08-31
Payer: COMMERCIAL

## 2021-02-18 DIAGNOSIS — Z23 ENCOUNTER FOR IMMUNIZATION: ICD-10-CM

## 2021-03-17 ENCOUNTER — IMMUNIZATIONS (OUTPATIENT)
Dept: FAMILY MEDICINE CLINIC | Facility: HOSPITAL | Age: 76
End: 2021-03-17

## 2021-03-17 DIAGNOSIS — Z23 ENCOUNTER FOR IMMUNIZATION: Primary | ICD-10-CM

## 2021-03-17 PROCEDURE — 91300 SARS-COV-2 / COVID-19 MRNA VACCINE (PFIZER-BIONTECH) 30 MCG: CPT

## 2021-03-17 PROCEDURE — 0001A SARS-COV-2 / COVID-19 MRNA VACCINE (PFIZER-BIONTECH) 30 MCG: CPT

## 2021-04-08 ENCOUNTER — IMMUNIZATIONS (OUTPATIENT)
Dept: FAMILY MEDICINE CLINIC | Facility: HOSPITAL | Age: 76
End: 2021-04-08

## 2021-04-08 DIAGNOSIS — Z23 ENCOUNTER FOR IMMUNIZATION: Primary | ICD-10-CM

## 2021-04-08 PROCEDURE — 91300 SARS-COV-2 / COVID-19 MRNA VACCINE (PFIZER-BIONTECH) 30 MCG: CPT

## 2021-04-08 PROCEDURE — 0002A SARS-COV-2 / COVID-19 MRNA VACCINE (PFIZER-BIONTECH) 30 MCG: CPT

## 2024-10-14 ENCOUNTER — HOSPITAL ENCOUNTER (OUTPATIENT)
Dept: HOSPITAL 99 - RCS | Age: 79
End: 2024-10-14
Payer: COMMERCIAL

## 2024-10-14 DIAGNOSIS — I25.10: ICD-10-CM

## 2024-10-14 DIAGNOSIS — I50.22: Primary | ICD-10-CM

## 2024-10-14 DIAGNOSIS — Z95.2: ICD-10-CM
